# Patient Record
Sex: MALE | Race: WHITE | NOT HISPANIC OR LATINO | Employment: OTHER | URBAN - METROPOLITAN AREA
[De-identification: names, ages, dates, MRNs, and addresses within clinical notes are randomized per-mention and may not be internally consistent; named-entity substitution may affect disease eponyms.]

---

## 2017-02-09 ENCOUNTER — COMMUNICATION - HEALTHEAST (OUTPATIENT)
Dept: FAMILY MEDICINE | Facility: CLINIC | Age: 47
End: 2017-02-09

## 2017-02-09 DIAGNOSIS — F90.0 ATTENTION DEFICIT HYPERACTIVITY DISORDER (ADHD), PREDOMINANTLY INATTENTIVE TYPE: ICD-10-CM

## 2017-03-15 ENCOUNTER — AMBULATORY - HEALTHEAST (OUTPATIENT)
Dept: FAMILY MEDICINE | Facility: CLINIC | Age: 47
End: 2017-03-15

## 2017-03-15 ENCOUNTER — OFFICE VISIT - HEALTHEAST (OUTPATIENT)
Dept: FAMILY MEDICINE | Facility: CLINIC | Age: 47
End: 2017-03-15

## 2017-03-15 DIAGNOSIS — Z13.0 SCREENING FOR ENDOCRINE, NUTRITIONAL, METABOLIC AND IMMUNITY DISORDER: ICD-10-CM

## 2017-03-15 DIAGNOSIS — Z13.29 SCREENING FOR ENDOCRINE, NUTRITIONAL, METABOLIC AND IMMUNITY DISORDER: ICD-10-CM

## 2017-03-15 DIAGNOSIS — Z13.21 SCREENING FOR ENDOCRINE, NUTRITIONAL, METABOLIC AND IMMUNITY DISORDER: ICD-10-CM

## 2017-03-15 DIAGNOSIS — Z79.899 HIGH RISK MEDICATION USE: ICD-10-CM

## 2017-03-15 DIAGNOSIS — R74.8 ELEVATED LIVER ENZYMES: ICD-10-CM

## 2017-03-15 DIAGNOSIS — Z13.228 SCREENING FOR ENDOCRINE, NUTRITIONAL, METABOLIC AND IMMUNITY DISORDER: ICD-10-CM

## 2017-03-15 DIAGNOSIS — F90.0 ATTENTION DEFICIT HYPERACTIVITY DISORDER (ADHD), PREDOMINANTLY INATTENTIVE TYPE: ICD-10-CM

## 2017-03-15 LAB — HBA1C MFR BLD: 5.1 % (ref 3.5–6)

## 2017-03-15 ASSESSMENT — MIFFLIN-ST. JEOR: SCORE: 1804.75

## 2017-03-16 LAB
HBV SURFACE AG SERPL QL IA: NEGATIVE
HCV AB SERPL QL IA: NEGATIVE

## 2017-03-17 ENCOUNTER — COMMUNICATION - HEALTHEAST (OUTPATIENT)
Dept: FAMILY MEDICINE | Facility: CLINIC | Age: 47
End: 2017-03-17

## 2017-04-17 ENCOUNTER — COMMUNICATION - HEALTHEAST (OUTPATIENT)
Dept: FAMILY MEDICINE | Facility: CLINIC | Age: 47
End: 2017-04-17

## 2017-04-17 DIAGNOSIS — F90.0 ATTENTION DEFICIT HYPERACTIVITY DISORDER (ADHD), PREDOMINANTLY INATTENTIVE TYPE: ICD-10-CM

## 2017-04-18 ENCOUNTER — AMBULATORY - HEALTHEAST (OUTPATIENT)
Dept: FAMILY MEDICINE | Facility: CLINIC | Age: 47
End: 2017-04-18

## 2017-04-18 DIAGNOSIS — Z79.899 HIGH RISK MEDICATION USE: ICD-10-CM

## 2017-05-09 ENCOUNTER — OFFICE VISIT - HEALTHEAST (OUTPATIENT)
Dept: FAMILY MEDICINE | Facility: CLINIC | Age: 47
End: 2017-05-09

## 2017-05-09 DIAGNOSIS — F52.8 PSYCHOSEXUAL DYSFUNCTION WITH INHIBITED SEXUAL EXCITEMENT: ICD-10-CM

## 2017-05-09 DIAGNOSIS — D23.0: ICD-10-CM

## 2017-05-09 DIAGNOSIS — F90.0 ATTENTION DEFICIT HYPERACTIVITY DISORDER (ADHD), PREDOMINANTLY INATTENTIVE TYPE: ICD-10-CM

## 2017-05-09 DIAGNOSIS — Z79.899 HIGH RISK MEDICATION USE: ICD-10-CM

## 2017-05-09 ASSESSMENT — MIFFLIN-ST. JEOR: SCORE: 1775.61

## 2017-05-18 ENCOUNTER — COMMUNICATION - HEALTHEAST (OUTPATIENT)
Dept: SCHEDULING | Facility: CLINIC | Age: 47
End: 2017-05-18

## 2017-05-18 DIAGNOSIS — F90.0 ATTENTION DEFICIT HYPERACTIVITY DISORDER (ADHD), PREDOMINANTLY INATTENTIVE TYPE: ICD-10-CM

## 2017-05-27 ENCOUNTER — RECORDS - HEALTHEAST (OUTPATIENT)
Dept: ADMINISTRATIVE | Facility: OTHER | Age: 47
End: 2017-05-27

## 2017-05-27 ENCOUNTER — COMMUNICATION - HEALTHEAST (OUTPATIENT)
Dept: FAMILY MEDICINE | Facility: CLINIC | Age: 47
End: 2017-05-27

## 2017-06-06 ENCOUNTER — RECORDS - HEALTHEAST (OUTPATIENT)
Dept: ADMINISTRATIVE | Facility: OTHER | Age: 47
End: 2017-06-06

## 2017-06-08 ENCOUNTER — COMMUNICATION - HEALTHEAST (OUTPATIENT)
Dept: FAMILY MEDICINE | Facility: CLINIC | Age: 47
End: 2017-06-08

## 2017-06-22 ENCOUNTER — COMMUNICATION - HEALTHEAST (OUTPATIENT)
Dept: SCHEDULING | Facility: CLINIC | Age: 47
End: 2017-06-22

## 2017-06-22 DIAGNOSIS — F90.0 ATTENTION DEFICIT HYPERACTIVITY DISORDER (ADHD), PREDOMINANTLY INATTENTIVE TYPE: ICD-10-CM

## 2017-07-21 ENCOUNTER — COMMUNICATION - HEALTHEAST (OUTPATIENT)
Dept: FAMILY MEDICINE | Facility: CLINIC | Age: 47
End: 2017-07-21

## 2017-07-21 ENCOUNTER — COMMUNICATION - HEALTHEAST (OUTPATIENT)
Dept: SCHEDULING | Facility: CLINIC | Age: 47
End: 2017-07-21

## 2017-07-21 DIAGNOSIS — F90.0 ATTENTION DEFICIT HYPERACTIVITY DISORDER (ADHD), PREDOMINANTLY INATTENTIVE TYPE: ICD-10-CM

## 2017-07-21 DIAGNOSIS — F52.8 PSYCHOSEXUAL DYSFUNCTION WITH INHIBITED SEXUAL EXCITEMENT: ICD-10-CM

## 2017-08-30 ENCOUNTER — OFFICE VISIT (OUTPATIENT)
Dept: FAMILY MEDICINE | Facility: CLINIC | Age: 47
End: 2017-08-30
Payer: MEDICAID

## 2017-08-30 VITALS
DIASTOLIC BLOOD PRESSURE: 91 MMHG | HEIGHT: 71 IN | HEART RATE: 84 BPM | BODY MASS INDEX: 26.25 KG/M2 | TEMPERATURE: 97 F | SYSTOLIC BLOOD PRESSURE: 141 MMHG | WEIGHT: 187.5 LBS

## 2017-08-30 DIAGNOSIS — F10.21 ALCOHOL DEPENDENCE IN REMISSION (H): Primary | ICD-10-CM

## 2017-08-30 PROCEDURE — 99213 OFFICE O/P EST LOW 20 MIN: CPT | Performed by: INTERNAL MEDICINE

## 2017-08-30 NOTE — PROGRESS NOTES
"  SUBJECTIVE:   Brandon Peterson is a 46 year old male who presents to clinic today for the following health issues:      Pt has some paperwork to be fill out for shelter. Currently recover from substance abuse.  ADHD ( balanced and manged.    Went through treatment  [facility] a [facility]  ALcohol ( cannabis for a little bit)    1 week ago.  300 ml ( vodka)  Few days withdrawal.        Problem list and histories reviewed & adjusted, as indicated.  Additional history: as documented    There is no problem list on file for this patient.    No past surgical history on file.    Social History   Substance Use Topics     Smoking status: Current Some Day Smoker     Smokeless tobacco: Current User     Alcohol use No     No family history on file.      Current Outpatient Prescriptions   Medication Sig Dispense Refill     Amphetamine-Dextroamphetamine (ADDERALL PO)        No Known Allergies  No lab results found.         Reviewed and updated as needed this visit by clinical staffTobacco  Allergies  Soc Hx      Reviewed and updated as needed this visit by Provider         ROS:  Constitutional, HEENT, cardiovascular, pulmonary, gi and gu systems are negative, except as otherwise noted.      OBJECTIVE:   BP (!) 141/91 (BP Location: Right arm)  Pulse 84  Temp 97  F (36.1  C) (Oral)  Ht 5' 11\" (1.803 m)  Wt 187 lb 8 oz (85 kg)  BMI 26.15 kg/m2  Body mass index is 26.15 kg/(m^2).  GENERAL: healthy, alert and no distress  NECK: no adenopathy, no asymmetry, masses, or scars and thyroid normal to palpation  RESP: lungs clear to auscultation - no rales, rhonchi or wheezes  CV: regular rate and rhythm, normal S1 S2, no S3 or S4, no murmur, click or rub, no peripheral edema and peripheral pulses strong  ABDOMEN: soft, nontender, no hepatosplenomegaly, no masses and bowel sounds normal  MS: no gross musculoskeletal defects noted, no edema    Diagnostic Test Results:  No results found for this or any previous " visit.    ASSESSMENT/PLAN:       ICD-10-CM    1. Alcohol dependence in remission (H) F10.21      Patient is over the risk period.  Does not want CD  Is enrolled in counaseling, and AA    Paperwork completed.  10 minutes        Shiva Esposito MD  [facility]

## 2017-08-30 NOTE — NURSING NOTE
"Chief Complaint   Patient presents with     paper work,       Initial BP (!) 141/91 (BP Location: Right arm)  Pulse 84  Temp 97  F (36.1  C) (Oral)  Ht 5' 11\" (1.803 m)  Wt 187 lb 8 oz (85 kg)  BMI 26.15 kg/m2 Estimated body mass index is 26.15 kg/(m^2) as calculated from the following:    Height as of this encounter: 5' 11\" (1.803 m).    Weight as of this encounter: 187 lb 8 oz (85 kg).  Medication Reconciliation: complete     Javon Moe MA      "

## 2017-08-30 NOTE — MR AVS SNAPSHOT
"              After Visit Summary   2017    Brandon Peterson    MRN: [id number]           Patient Information     Date Of Birth          1970        Visit Information        Provider Department      2017 12:40 PM Shiva Esposito MD [facility]         Follow-ups after your visit        Who to contact     If you have questions or need follow up information about today's clinic visit or your schedule please contact [facility] directly at [phone].  Normal or non-critical lab and imaging results will be communicated to you by [employer], letter or phone within 4 business days after the clinic has received the results. If you do not hear from us within 7 days, please contact the clinic through [employer] or phone. If you have a critical or abnormal lab result, we will notify you by phone as soon as possible.  Submit refill requests through [employer] or call your pharmacy and they will forward the refill request to us. Please allow 3 business days for your refill to be completed.          Additional Information About Your Visit        [employer] Information     [employer] lets you send messages to your doctor, view your test results, renew your prescriptions, schedule appointments and more. To sign up, go to [email or web address] . Click on \"Log in\" on the left side of the screen, which will take you to the Welcome page. Then click on \"Sign up Now\" on the right side of the page.     You will be asked to enter the access code listed below, as well as some personal information. Please follow the directions to create your username and password.     Your access code is: [email or web address]  Expires: 2017  1:14 PM     Your access code will  in 90 days. If you need help or a new code, please call your [facility] or [phone].        Care EveryWhere ID     This is your Care EveryWhere ID. This could be used by other organizations to access your [facility] medical " "records  [id number]        Your Vitals Were     Pulse Temperature Height BMI (Body Mass Index)          84 97  F (36.1  C) (Oral) 5' 11\" (1.803 m) 26.15 kg/m2         Blood Pressure from Last 3 Encounters:   08/30/17 (!) 141/91   07/15/14 (!) 144/106    Weight from Last 3 Encounters:   08/30/17 187 lb 8 oz (85 kg)              Today, you had the following     No orders found for display       Primary Care Provider Office Phone # Fax #    [facility] [phone]       [address] [facility] [address]        Equal Access to Services     JOEY KRAMER : Hadii maya armaso Soprince, waaxda luqadaha, qaybta kaalmada adeelenayada, harris trujillo . So [facility] [phone].    ATENCIÓN: Si habla español, tiene a arias disposición servicios gratuitos de asistencia lingüística. Llame al [phone].    We comply with applicable federal civil rights laws and [place] laws. We do not discriminate on the basis of race, color, national origin, age, disability sex, sexual orientation or gender identity.            Thank you!     Thank you for choosing [facility]  for your care. Our goal is always to provide you with excellent care. Hearing back from our patients is one way we can continue to improve our services. Please take a few minutes to complete the written survey that you may receive in the mail after your visit with us. Thank you!             Your Updated Medication List - Protect others around you: Learn how to safely use, store and throw away your medicines at www.disposemymeds.org.          This list is accurate as of: 8/30/17  1:14 PM.  Always use your most recent med list.                   Brand Name Dispense Instructions for use Diagnosis    ADDERALL PO             "

## 2017-09-18 ENCOUNTER — COMMUNICATION - HEALTHEAST (OUTPATIENT)
Dept: FAMILY MEDICINE | Facility: CLINIC | Age: 47
End: 2017-09-18

## 2017-09-27 ENCOUNTER — OFFICE VISIT - HEALTHEAST (OUTPATIENT)
Dept: FAMILY MEDICINE | Facility: CLINIC | Age: 47
End: 2017-09-27

## 2017-09-27 DIAGNOSIS — F90.0 ATTENTION DEFICIT HYPERACTIVITY DISORDER (ADHD), PREDOMINANTLY INATTENTIVE TYPE: ICD-10-CM

## 2017-09-27 DIAGNOSIS — Z79.899 CONTROLLED SUBSTANCE AGREEMENT SIGNED: ICD-10-CM

## 2017-09-27 DIAGNOSIS — F52.8 PSYCHOSEXUAL DYSFUNCTION WITH INHIBITED SEXUAL EXCITEMENT: ICD-10-CM

## 2017-09-27 DIAGNOSIS — R74.8 ELEVATED LIVER ENZYMES: ICD-10-CM

## 2017-09-27 DIAGNOSIS — M23.92 KNEE LIGAMENTOUS LAXITY, LEFT: ICD-10-CM

## 2017-09-27 ASSESSMENT — MIFFLIN-ST. JEOR: SCORE: 1758.6

## 2017-10-23 ENCOUNTER — RECORDS - HEALTHEAST (OUTPATIENT)
Dept: ADMINISTRATIVE | Facility: OTHER | Age: 47
End: 2017-10-23

## 2017-10-31 ENCOUNTER — RECORDS - HEALTHEAST (OUTPATIENT)
Dept: ADMINISTRATIVE | Facility: OTHER | Age: 47
End: 2017-10-31

## 2018-01-11 ENCOUNTER — OFFICE VISIT - HEALTHEAST (OUTPATIENT)
Dept: FAMILY MEDICINE | Facility: CLINIC | Age: 48
End: 2018-01-11

## 2018-01-11 DIAGNOSIS — F90.0 ATTENTION DEFICIT HYPERACTIVITY DISORDER (ADHD), PREDOMINANTLY INATTENTIVE TYPE: ICD-10-CM

## 2018-01-11 DIAGNOSIS — R74.8 ELEVATED LIVER ENZYMES: ICD-10-CM

## 2018-01-11 DIAGNOSIS — Z01.818 PREOPERATIVE EVALUATION TO RULE OUT SURGICAL CONTRAINDICATION: ICD-10-CM

## 2018-01-11 DIAGNOSIS — S83.8X2S: ICD-10-CM

## 2018-01-11 LAB
ALBUMIN SERPL-MCNC: 4.1 G/DL (ref 3.5–5)
ALP SERPL-CCNC: 72 U/L (ref 45–120)
ALT SERPL W P-5'-P-CCNC: 105 U/L (ref 0–45)
AST SERPL W P-5'-P-CCNC: 116 U/L (ref 0–40)
BILIRUB DIRECT SERPL-MCNC: 0.5 MG/DL
BILIRUB SERPL-MCNC: 1.4 MG/DL (ref 0–1)
ERYTHROCYTE [DISTWIDTH] IN BLOOD BY AUTOMATED COUNT: 14.3 % (ref 11–14.5)
GGT SERPL-CCNC: 100 U/L (ref 0–50)
HCT VFR BLD AUTO: 46.8 % (ref 40–54)
HGB BLD-MCNC: 15 G/DL (ref 14–18)
INR PPP: 1.04 (ref 0.9–1.1)
MCH RBC QN AUTO: 29.2 PG (ref 27–34)
MCHC RBC AUTO-ENTMCNC: 32.1 G/DL (ref 32–36)
MCV RBC AUTO: 91 FL (ref 80–100)
PLATELET # BLD AUTO: 187 THOU/UL (ref 140–440)
PMV BLD AUTO: 7.4 FL (ref 7–10)
PROT SERPL-MCNC: 7.7 G/DL (ref 6–8)
RBC # BLD AUTO: 5.14 MILL/UL (ref 4.4–6.2)
WBC: 6.5 THOU/UL (ref 4–11)

## 2018-01-11 ASSESSMENT — MIFFLIN-ST. JEOR: SCORE: 1790.35

## 2018-01-12 ENCOUNTER — RECORDS - HEALTHEAST (OUTPATIENT)
Dept: ADMINISTRATIVE | Facility: OTHER | Age: 48
End: 2018-01-12

## 2018-01-13 ENCOUNTER — COMMUNICATION - HEALTHEAST (OUTPATIENT)
Dept: FAMILY MEDICINE | Facility: CLINIC | Age: 48
End: 2018-01-13

## 2018-02-23 ENCOUNTER — COMMUNICATION - HEALTHEAST (OUTPATIENT)
Dept: FAMILY MEDICINE | Facility: CLINIC | Age: 48
End: 2018-02-23

## 2018-02-23 DIAGNOSIS — F90.0 ATTENTION DEFICIT HYPERACTIVITY DISORDER (ADHD), PREDOMINANTLY INATTENTIVE TYPE: ICD-10-CM

## 2018-04-09 ENCOUNTER — COMMUNICATION - HEALTHEAST (OUTPATIENT)
Dept: FAMILY MEDICINE | Facility: CLINIC | Age: 48
End: 2018-04-09

## 2018-04-09 DIAGNOSIS — F90.0 ATTENTION DEFICIT HYPERACTIVITY DISORDER (ADHD), PREDOMINANTLY INATTENTIVE TYPE: ICD-10-CM

## 2018-04-09 DIAGNOSIS — F52.8 PSYCHOSEXUAL DYSFUNCTION WITH INHIBITED SEXUAL EXCITEMENT: ICD-10-CM

## 2018-04-11 ENCOUNTER — COMMUNICATION - HEALTHEAST (OUTPATIENT)
Dept: FAMILY MEDICINE | Facility: CLINIC | Age: 48
End: 2018-04-11

## 2018-04-11 DIAGNOSIS — F90.0 ATTENTION DEFICIT HYPERACTIVITY DISORDER (ADHD), PREDOMINANTLY INATTENTIVE TYPE: ICD-10-CM

## 2018-04-11 DIAGNOSIS — F52.8 PSYCHOSEXUAL DYSFUNCTION WITH INHIBITED SEXUAL EXCITEMENT: ICD-10-CM

## 2018-05-16 ENCOUNTER — COMMUNICATION - HEALTHEAST (OUTPATIENT)
Dept: FAMILY MEDICINE | Facility: CLINIC | Age: 48
End: 2018-05-16

## 2018-05-16 DIAGNOSIS — F52.8 PSYCHOSEXUAL DYSFUNCTION WITH INHIBITED SEXUAL EXCITEMENT: ICD-10-CM

## 2018-05-16 DIAGNOSIS — F90.0 ATTENTION DEFICIT HYPERACTIVITY DISORDER (ADHD), PREDOMINANTLY INATTENTIVE TYPE: ICD-10-CM

## 2018-06-21 ENCOUNTER — COMMUNICATION - HEALTHEAST (OUTPATIENT)
Dept: FAMILY MEDICINE | Facility: CLINIC | Age: 48
End: 2018-06-21

## 2018-06-21 DIAGNOSIS — F90.0 ATTENTION DEFICIT HYPERACTIVITY DISORDER (ADHD), PREDOMINANTLY INATTENTIVE TYPE: ICD-10-CM

## 2018-06-26 ENCOUNTER — COMMUNICATION - HEALTHEAST (OUTPATIENT)
Dept: FAMILY MEDICINE | Facility: CLINIC | Age: 48
End: 2018-06-26

## 2018-06-26 DIAGNOSIS — F90.0 ATTENTION DEFICIT HYPERACTIVITY DISORDER (ADHD), PREDOMINANTLY INATTENTIVE TYPE: ICD-10-CM

## 2018-08-09 ENCOUNTER — COMMUNICATION - HEALTHEAST (OUTPATIENT)
Dept: FAMILY MEDICINE | Facility: CLINIC | Age: 48
End: 2018-08-09

## 2018-08-09 DIAGNOSIS — F90.0 ATTENTION DEFICIT HYPERACTIVITY DISORDER (ADHD), PREDOMINANTLY INATTENTIVE TYPE: ICD-10-CM

## 2021-05-30 VITALS — HEIGHT: 71 IN | WEIGHT: 196.75 LBS | BODY MASS INDEX: 27.54 KG/M2

## 2021-05-30 VITALS — HEIGHT: 71 IN | BODY MASS INDEX: 28.42 KG/M2 | WEIGHT: 203 LBS

## 2021-05-31 VITALS — HEIGHT: 71 IN | WEIGHT: 193 LBS | BODY MASS INDEX: 27.02 KG/M2

## 2021-05-31 VITALS — HEIGHT: 71 IN | BODY MASS INDEX: 28 KG/M2 | WEIGHT: 200 LBS

## 2021-06-09 NOTE — PROGRESS NOTES
ASSESSMENT & PLAN:  1. Screening for endocrine, nutritional, metabolic and immunity disorder  - Glycosylated Hemoglobin A1c  2. Elevated liver enzymes  From alcohol abuse, we discussed about the different options treatment, support groups like AA meeting, chemical dependency reference,  - Hepatitis B Surface Antigen (HBsAG)  - Comprehensive Metabolic Panel  - Bilirubin, Direct  - GGT (Gamma GT)  - Hepatitis C Antibody (Anti-HCV)  3. High risk medication use  Currently on Adderall for ADHD, but I told him that if he continued to drink alcohol while taking this medication, then should not be a candidate for Adderral.  We will recheck his LFTs every month  - Pain Clinic Survey, Urine      Patient Instructions   Brandon,  Liver function tests are still significantly elevated from alcohol use, I like to see these numbers  going down, as we discussed, I would like you to see  chemical dependency specialist, start  support like AA meeting, if your numbers are not improving,and you continue to drink alcohol, I will be reluctant to continue prescribed Adderall to you.recheck in 1 month.      No orders of the defined types were placed in this encounter.    Medications Discontinued During This Encounter   Medication Reason     dextroamphetamine-amphetamine 30 mg Tab Therapy completed       No Follow-up on file.    CHIEF COMPLAINT:  Chief Complaint   Patient presents with     Medication Refill     ADDERALL     Labs Only       HISTORY OF PRESENT ILLNESS:  Brandon is a 46 y.o. male presenting to the clinic today for a refill of Adderall. He notes that the medication is working well for him, and he does not have any side effects. 30mg twice a day. He notes that the medication helps calm him; he is able to sleep and focus better. He is able to concentrate at one thing at a time, instead of becoming hyperactive.     Alcohol Abuse: His liver enzymes were high today. He often drinks alcohol and has had abuse problems in the past. He has  "been in rehab in the past. He does not like to drink on his medication and is aware of the side effects. He is looking for a new  support group due to a recent move.       REVIEW OF SYSTEMS:   He is feeling stressed; he is about to get  and inherit 2 kids. He is feeling the most happy and healthy that he has been in his life. He admits to smoking marijuana a couple of weeks ago. All other systems are negative.    PFSH:  Medical: a  Surgical: a  Family: a  Social: a    Tobacco Use:  History   Smoking Status     Former Smoker   Smokeless Tobacco     Current User       VITALS:  Vitals:    03/15/17 1155 03/15/17 1158   BP: (!) 136/100 122/86   Patient Site: Right Arm Left Arm   Pulse: 68    Resp: 13    Temp: 98.6  F (37  C)    TempSrc: Oral    Weight: 203 lb (92.1 kg)    Height: 5' 10.8\" (1.798 m)      Wt Readings from Last 3 Encounters:   03/15/17 203 lb (92.1 kg)   11/30/16 204 lb (92.5 kg)   05/09/16 198 lb (89.8 kg)     Body mass index is 28.47 kg/(m^2).    PHYSICAL EXAM:  General:  Patient alert, in no acute distress.  Resp:  Clear to auscultation without crackles, wheezes or distress.  Normal respiratory effort.   CV:  Regular rate and rhythm without murmurs, rubs or gallops.  Normal pedal pulses.  No varicosities or edema.  Abdomen:  Soft, non-tender, without hepatosplenomegaly, masses, or hernias.   Neuro:  CN II-XII intact, motor & sensory function all intact.  DTR and reflexes normal.  Psychiatric:  Alert & oriented with normal mood and affect.  Good judgment and insight.  Thought process and language adequate. Well-groomed. Psychomotor activity normal.      ADDITIONAL HISTORY SUMMARIZED (2): None.  DECISION TO OBTAIN EXTRA INFORMATION (1): None.   RADIOLOGY TESTS (1): None.  LABS (1): None.  MEDICINE TESTS (1): None.  INDEPENDENT REVIEW (2 each): None.     The visit lasted a total of 14 minutes face to face with the patient. Over 50% of the time was spent counseling and educating the patient about " medication management.    I, Edda Le, am scribing for and in the presence of, Dr. Barcenas.    I, Dr. Barcenas, personally performed the services described in this documentation, as scribed by Edda Le in my presence, and it is both accurate and complete.    Dragon dictation was used for this note.  Speech recognition errors are a possibility.    MEDICATIONS:  Current Outpatient Prescriptions   Medication Sig Dispense Refill     dextroamphetamine-amphetamine (ADDERALL) 30 mg Tab Take 30 mg by mouth daily. 60 tablet 0     IBUPROFEN ORAL        multivitamin (MULTIVITAMIN) per tablet Take 1 tablet by mouth daily.       sildenafil (REVATIO) 20 mg tablet Take 1 tablet (20 mg total) by mouth as needed. 30 tablet 0     fluticasone (FLONASE) 50 mcg/actuation nasal spray 2 sprays into each nostril daily. 16 g 11     No current facility-administered medications for this visit.        Total data points: 0

## 2021-06-13 NOTE — PROGRESS NOTES
"OFFICE VISIT - FAMILY MEDICINE     ASSESSMENT AND PLAN     1. Attention deficit hyperactivity disorder (ADHD), predominantly inattentive type     2. Male Erectile Disorder  sildenafil (REVATIO) 20 mg tablet   3. Controlled substance agreement signed     4. Knee ligamentous laxity, left  Ambulatory referral to Orthopedics   5. Elevated liver enzymes  HM2(CBC w/o Differential)    INR    Hepatic Profile    GGT (Gamma GT)   ADHD appears stable, continue current treatment, check every 6 months, sooner if there is any question concern.  Long-term side effect of medication discussed.  Erectile dysfunction controlled with sildenafil, knee laxity, suspecting ACL tear, he will see orthopedics.    CHIEF COMPLAINT   Medication Refill (REVATIO); Back Pain (X 1 MTH); Blood Pressure Check; and Knee Pain (REF?)    HPI   Brandon Peterson is a 46 y.o. male.  Updated NEHAL    Recently moved back to [place] his fiancée, does have a history of ADHD diagnosed about 7-8 years ago, has been on Adderall since then.  Medication does help him focus, complete his task at work.  Denies any significant side effects like headaches, dizziness, loss of appetite or chest pain.  Has had some issues with alcohol and marijuana use in the past, but has been sober for almost 9 months.  Has been on long acting Adderall in the past, but did not seems to help and was experiencing more side effect.  Left knee seems to be bothering him lately,feel loose no significant pain.  Does have an appointment to see orthopedist.  Sildenafil 20 mg is helping his erectile dysfunction, he would like a refill.  Review of Systems As per HPI, otherwise negative.    OBJECTIVE   /84 (Patient Site: Right Arm)  Pulse 64  Temp 98.4  F (36.9  C) (Oral)   Resp 13  Ht 5' 10.75\" (1.797 m)  Wt 193 lb (87.5 kg)  BMI 27.11 kg/m2  Physical Exam   Constitutional: He is oriented to person, place, and time. He appears well-developed and well-nourished.   HENT:   Head: " Normocephalic and atraumatic.   Neck: Normal range of motion. Neck supple. No JVD present. No tracheal deviation present. No thyromegaly present.   Cardiovascular: Normal rate, regular rhythm, normal heart sounds and intact distal pulses.  Exam reveals no gallop and no friction rub.    No murmur heard.  Pulmonary/Chest: Effort normal and breath sounds normal. No respiratory distress. He has no wheezes. He has no rales.   Musculoskeletal: He exhibits no edema or tenderness.   Laxity of the left knee, with positive anterior drawer.   Lymphadenopathy:     He has no cervical adenopathy.   Neurological: He is alert and oriented to person, place, and time. Coordination normal.   Psychiatric: He has a normal mood and affect. Judgment and thought content normal.       [facility]     Family History   Problem Relation Age of Onset     Diabetes Father      Cancer Maternal Grandfather      Bone cancer     Social History     Social History     Marital status:      Spouse name: N/A     Number of children: N/A     Years of education: N/A     Occupational History     Not on file.     Social History Main Topics     Smoking status: Former Smoker     Smokeless tobacco: Current User     Alcohol use No      Comment: Quit drinking a few years ago.     Drug use: Not on file     Sexual activity: Not Currently     Partners: Female     Other Topics Concern     Not on file     Social History Narrative     Relevant history was reviewed with the patient today, unless noted in HPI nothing is pertinent for this visit.  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     Elevated liver enzymes 03/25/2015     Hepatitis B vaccination not up to date 03/25/2015     Failure to attend appointment 02/05/2015     Overview Note:     2/2/2015       Allergic Rhinitis      Overview Note:     Replacement Utility updated for latest  IMO load       Lower Back Pain      Insomnia      Anxiety Disorder NOS      Overview Note:     Replacement Utility updated for latest IMO load       Male Erectile Disorder      Attention deficit hyperactivity disorder (ADHD)      Male Infertility      Past Surgical History:   Procedure Laterality Date     KNEE ARTHROSCOPY       WISDOM TOOTH EXTRACTION         RESULTS/CONSULTS (Lab/Rad)     Recent Results (from the past 168 hour(s))   HM2(CBC w/o Differential)   Result Value Ref Range    WBC 6.4 4.0 - 11.0 thou/uL    RBC 5.03 4.40 - 6.20 mill/uL    Hemoglobin 14.8 14.0 - 18.0 g/dL    Hematocrit 45.2 40.0 - 54.0 %    MCV 90 80 - 100 fL    MCH 29.3 27.0 - 34.0 pg    MCHC 32.6 32.0 - 36.0 g/dL    RDW 13.0 11.0 - 14.5 %    Platelets 205 140 - 440 thou/uL    MPV 7.6 7.0 - 10.0 fL   INR   Result Value Ref Range    INR 1.03 0.90 - 1.10   Hepatic Profile   Result Value Ref Range    Bilirubin, Total 0.5 0.0 - 1.0 mg/dL    Bilirubin, Direct 0.2 <=0.5 mg/dL    Protein, Total 7.5 6.0 - 8.0 g/dL    Albumin 4.0 3.5 - 5.0 g/dL    Alkaline Phosphatase 76 45 - 120 U/L    AST 27 0 - 40 U/L    ALT 22 0 - 45 U/L   GGT (Gamma GT)   Result Value Ref Range    GGT (Gamma GT) 26 0 - 50 U/L     No results found.  MEDICATIONS     Current Outpatient Prescriptions on File Prior to Visit   Medication Sig Dispense Refill     dextroamphetamine-amphetamine (ADDERALL) 30 mg Tab Take 30 mg by mouth 2 (two) times a day. 60 tablet 0     fluticasone (FLONASE) 50 mcg/actuation nasal spray 2 sprays into each nostril daily. 16 g 11     multivitamin (MULTIVITAMIN) per tablet Take 1 tablet by mouth daily.       dextroamphetamine-amphetamine 30 mg Tab Take 30 mg by mouth 2 (two) times a day. 60 tablet 0     IBUPROFEN ORAL        No current facility-administered medications on file prior to visit.        HEALTH MAINTENANCE / SCREENING   PHQ-2 Total Score: 1 (3/15/2017 11:00 AM), No Data Recorded,No Data Recorded  Immunization History   Administered Date(s)  Administered     Hep A, Adult 05/09/2016     Hep A, historic 01/17/2014     Influenza, seasonal,quad inj 6-35 mos 11/07/2012, 10/13/2014     Influenza,seasonal quad, PF 10/17/2013     Influenza,seasonal quad, PF, 36+MOS 11/17/2015     Tdap 07/28/2009     Health Maintenance   Topic     INFLUENZA VACCINE RULE BASED (1)     TDAP ADULT ONE TIME DOSE      ADVANCE DIRECTIVES DISCUSSED WITH PATIENT      TD 18+ HE        Keeley Barcenas MD  Family Medicine, [facility]   This transcription uses voice recognition software, which may contain typographical errors.

## 2021-06-15 NOTE — PROGRESS NOTES
Preoperative Exam    Scheduled Procedure: L KNEE SURGERY   Surgery Date:  01/12  Surgery Location: [facility]  Surgeon:  DR HARTMANN    Assessment/Plan:     1. Preoperative evaluation to rule out surgical contraindication    2. Acute meniscal injury of left knee, sequela    3. Elevated liver enzymes  - HM2(CBC w/o Differential)  - INR  - Hepatic Profile  - GGT (Gamma GT)     Surgical Procedure Risk: Low (reported cardiac risk generally < 1%)  Have you had prior anesthesia?: Yes  Have you or any family members had a previous anesthesia reaction:  No  Do you or any family members have a history of a clotting or bleeding disorder?: Yes: Grand Mother legs problems,?varicoce vein  Cardiac Risk Assessment: no increased risk for major cardiac complications  Reviewed risks (not limited to bleeding,infection,pain,un-anticipated response to anesthesia ecc) and benefits (diagnostic and therapeutic) of surgery with patient, he understands the risks of the procedure and would like to proceed.  Patient's active problems diagnostically and therapeutically optimized for planned procedure with, Local or General anesthesia    Patient approved for surgery with general or local anesthesia.    Please Note:  patient is taking adderal for ADHD    Functional Status: Independant  Patient plans to recover at home with family.     Subjective:      Brandon Peterson is a 47 y.o. male who presents for a preoperative consultation.    Left knee pain with meniscus injury, conservative management did not help, scheduled for knee arthroscopic surgery.  Tolerated a previous right knee arthroscopic surgery.  Will be moving to [place] in about a month.  All other systems reviewed and are negative, other than those listed in the HPI.    Pertinent History  Do you have difficulty breathing or chest pain after walking up a flight of stairs: No  History of obstructive sleep apnea: No  Steroid use in the last 6 months: No  Frequent Aspirin/NSAID use: Yes:  but stopped  Prior Blood Transfusion: No  Prior Blood Transfusion Reaction: No  If for some reason prior to, during or after the procedure, if it is medically indicated, would you be willing to have a blood transfusion?:  There is no transfusion refusal.    Current Outpatient Prescriptions   Medication Sig Dispense Refill     dextroamphetamine-amphetamine (ADDERALL) 30 mg Tab Take 30 mg by mouth 2 (two) times a day. 60 tablet 0     dextroamphetamine-amphetamine (ADDERALL) 30 mg Tab Take 30 mg by mouth 2 (two) times a day. 60 tablet 0     dextroamphetamine-amphetamine 30 mg Tab Take 30 mg by mouth 2 (two) times a day. 60 tablet 0     fluticasone (FLONASE) 50 mcg/actuation nasal spray 2 sprays into each nostril daily. 16 g 11     IBUPROFEN ORAL        multivitamin (MULTIVITAMIN) per tablet Take 1 tablet by mouth daily.       sildenafil (REVATIO) 20 mg tablet Take 2 tablets (40 mg total) by mouth as needed (1 h before needed). 30 tablet 3     No current facility-administered medications for this visit.         Allergies   Allergen Reactions     Citalopram Analogues Rash       Patient Active Problem List   Diagnosis     Allergic Rhinitis     Lower Back Pain     Insomnia     Anxiety Disorder NOS     Male Erectile Disorder     Attention deficit hyperactivity disorder (ADHD)     Male Infertility     Failure to attend appointment     Elevated liver enzymes     Hepatitis B vaccination not up to date     Mucocele of appendix     Scrotal mass     Controlled substance agreement signed       No past medical history on file.    Past Surgical History:   Procedure Laterality Date     KNEE ARTHROSCOPY       WISDOM TOOTH EXTRACTION         Social History     Social History     Marital status:      Spouse name: N/A     Number of children: N/A     Years of education: N/A     Occupational History     Not on file.     Social History Main Topics     Smoking status: Former Smoker     Smokeless tobacco: Current User     Alcohol use  No      Comment: Quit drinking a few years ago.     Drug use: Not on file     Sexual activity: Not Currently     Partners: Female     Other Topics Concern     Not on file     Social History Narrative           Review of Systems   Constitutional: Negative.    HENT: Negative.    Eyes: Negative.    Respiratory: Negative.    Cardiovascular: Negative.    Gastrointestinal: Negative.    Endocrine: Negative.    Genitourinary: Negative.    Musculoskeletal: Positive for joint swelling (left knee).   Skin: Negative.    Neurological: Negative.    Hematological: Negative.    Psychiatric/Behavioral: Negative.        Objective:     There were no vitals filed for this visit.      Physical Exam:  Physical Exam   Constitutional: He appears well-developed and well-nourished.   HENT:   Right Ear: External ear normal.   Left Ear: External ear normal.   Nose: Nose normal.   Mouth/Throat: Oropharynx is clear and moist.   Eyes: Conjunctivae and EOM are normal. Pupils are equal, round, and reactive to light. Right eye exhibits no discharge. Left eye exhibits no discharge.   Neck: No thyromegaly present.   Cardiovascular: Normal rate, regular rhythm and normal heart sounds.    No murmur heard.  Pulmonary/Chest: Effort normal and breath sounds normal.   Abdominal: Soft. Bowel sounds are normal. He exhibits no distension and no mass. There is no tenderness. There is no rebound and no guarding.   Genitourinary: Right testis is descended. Left testis is descended.   Musculoskeletal: Normal range of motion. He exhibits tenderness (left knee).   Lymphadenopathy:     He has no cervical adenopathy.   Neurological: He is alert. He has normal reflexes.   Skin: Skin is warm and dry. No rash noted.   Psychiatric: He has a normal mood and affect.       There are no Patient Instructions on file for this visit.      Labs:  Recent Results (from the past 24 hour(s))   HM2(CBC w/o Differential)    Collection Time: 01/11/18  2:16 PM   Result Value Ref Range     WBC 6.5 4.0 - 11.0 thou/uL    RBC 5.14 4.40 - 6.20 mill/uL    Hemoglobin 15.0 14.0 - 18.0 g/dL    Hematocrit 46.8 40.0 - 54.0 %    MCV 91 80 - 100 fL    MCH 29.2 27.0 - 34.0 pg    MCHC 32.1 32.0 - 36.0 g/dL    RDW 14.3 11.0 - 14.5 %    Platelets 187 140 - 440 thou/uL    MPV 7.4 7.0 - 10.0 fL       Immunization History   Administered Date(s) Administered     Hep A, Adult IM (19yr & older) 01/17/2014, 05/09/2016     Hep A, historic 01/17/2014     Influenza, seasonal,quad inj 6-35 mos 11/07/2012, 10/13/2014     Influenza,seasonal quad, PF 10/17/2013     Influenza,seasonal quad, PF, 36+MOS 10/17/2013, 11/17/2015     Influenza,seasonal, Inj IIV3 11/07/2012, 10/13/2014     Tdap 07/28/2009           Electronically signed by Keeley Barcenas MD 01/11/18 2:32 PM

## 2021-06-16 PROBLEM — Z79.899 CONTROLLED SUBSTANCE AGREEMENT SIGNED: Status: ACTIVE | Noted: 2017-09-27

## 2021-07-03 NOTE — ADDENDUM NOTE
Addendum Note by Keeley Barcenas MD at 6/22/2017  5:19 PM     Author: Keeley Barcenas MD Service: -- Author Type: Physician    Filed: 6/22/2017  5:19 PM Encounter Date: 6/22/2017 Status: Signed    : Keeley Barcenas MD (Physician)    Addended by: KEELEY BARCENAS on: 6/22/2017 05:19 PM        Modules accepted: Orders

## 2021-08-07 ENCOUNTER — HEALTH MAINTENANCE LETTER (OUTPATIENT)
Age: 51
End: 2021-08-07

## 2021-10-02 ENCOUNTER — HEALTH MAINTENANCE LETTER (OUTPATIENT)
Age: 51
End: 2021-10-02

## 2021-10-13 NOTE — PROGRESS NOTES
[facility]  FAMILY  MEDICINE  OFFICE  VISIT     ASSESSMENT & PLAN     Diagnoses and all orders for this visit:    Angiokeratoma of lip  -     Ambulatory referral to Dentistry    Attention deficit hyperactivity disorder (ADHD), predominantly inattentive type  -     dextroamphetamine-amphetamine 30 mg Tab; Take 30 mg by mouth 2 (two) times a day.  Dispense: 60 tablet; Refill: 0  Adderall was refilled, urine drug test will be done, if negative then would consider 3 months prescription with regular follow-up.  If positive then we will not prescribe Adderall to him anymore, we will have patient seen different provider.  Male Erectile Disorder  -     sildenafil (REVATIO) 20 mg tablet; Take 1 tablet (20 mg total) by mouth as needed.  Dispense: 30 tablet; Refill: 1  LFTs elevation  -     HM2(CBC w/o Differential)  -     INR  -     Hepatic Profile  -     GGT (Gamma GT)  -     Vitamin B12  -     Magnesium  -     Folate, Serum  Has been seen at the outpatient treatment, has been sober for over a months, check LFTs panels.  Encourage sobriety.      SUBJECTIVE   No specialty comments available.  Patient with ADHD, history of alcohol abuse with relapsing remitting episodes, patient had some mildly elevated liver function tests at his last visit, and urine drug screen was positive for marijuana.  Result were discussed with the patient, Today,he stating that has been cutting on the alcohol, has been going to outpatient treatment, has not smoked marijuana for a few months long, planning on getting  in July.  Adderall does help him focus, is able to complete his daily task, otherwise he will be having some issue with concentration delay of finishing his daily tasks.  Usually takes 30 mg 2 times a day with good symptoms control.  Has a small lesion in his left lowe lips, was told that is an angioma, sometimes will bleed,would like to see a specialist  "for further care.  Has been taking Revatio 20 mg for erectile dysfunction with good response.  Asking a refill.    OBJECTIVE    /78 (Patient Site: Left Arm, Patient Position: Sitting, Cuff Size: Adult Regular)  Pulse 72  Ht 5' 10.75\" (1.797 m)  Wt 196 lb 12 oz (89.2 kg)  BMI 27.64 kg/m2  Physical Exam   Constitutional: He appears well-developed and well-nourished.   HENT:   Head: Normocephalic and atraumatic.   Mouth/Throat: Oral lesions (1/2 cm left lower lip angioma lesion,red,bluish,no active bleeding) present.   Neck: Normal range of motion. Neck supple. No JVD present. No tracheal deviation present. No thyromegaly present.   Cardiovascular: Normal rate, regular rhythm, normal heart sounds and intact distal pulses.  Exam reveals no gallop and no friction rub.    No murmur heard.  Pulmonary/Chest: Effort normal and breath sounds normal. No respiratory distress. He has no wheezes. He has no rales.   Abdominal: He exhibits no distension. There is no tenderness. There is no rebound and no guarding.   Musculoskeletal: He exhibits no edema or tenderness.   Lymphadenopathy:     He has no cervical adenopathy.   Psychiatric: He has a normal mood and affect. Judgment and thought content normal.     Keeley Barcenas MD  Family Medicine, [facility]   This transcription uses voice recognition software, which may contain typographical errors. Chief Complaint:    Chief Complaint   Patient presents with     Follow-up     Med check     Brandon Peterson is a 46 y.o. male.  Updated MIIC       There are no preventive care reminders to display for this patient.    Patient Active Problem List   Diagnosis     Allergic Rhinitis     Lower Back Pain     Insomnia     Anxiety Disorder NOS     Male Erectile Disorder     Attention deficit hyperactivity disorder (ADHD)     Male Infertility     Failure to attend appointment     Elevated liver enzymes     Hepatitis A vaccination not up to date     Hepatitis B " "vaccination not up to date     Mucocele of appendix     Scrotal mass     No past medical history on file.    Current Outpatient Prescriptions   Medication Sig Dispense Refill     [START ON 5/17/2017] dextroamphetamine-amphetamine 30 mg Tab Take 30 mg by mouth 2 (two) times a day. 60 tablet 0     fluticasone (FLONASE) 50 mcg/actuation nasal spray 2 sprays into each nostril daily. 16 g 11     IBUPROFEN ORAL        multivitamin (MULTIVITAMIN) per tablet Take 1 tablet by mouth daily.       sildenafil (REVATIO) 20 mg tablet Take 1 tablet (20 mg total) by mouth as needed. 30 tablet 1     No current facility-administered medications for this visit.      Allergies as of 05/09/2017 - Alec as Reviewed 05/09/2017   Allergen Reaction Noted     Citalopram analogues Rash 07/28/2011     ROS    \"Review of systems complete head to toe is otherwise negative.\"}  OTHER     Wt Readings from Last 3 Encounters:   05/09/17 196 lb 12 oz (89.2 kg)   03/15/17 203 lb (92.1 kg)   11/30/16 204 lb (92.5 kg)       PHQ-9:  PHQ-2 Total Score: 1 (3/15/2017 11:00 AM)  The following are part of a depression follow up plan for the patient:  coping support assessment, coping support management and emotional support assessment  No Follow-up on file.    Social History   Substance Use Topics     Smoking status: Former Smoker     Smokeless tobacco: Current User     Alcohol use No      Comment: Quit drinking a few years ago.       Family History   Problem Relation Age of Onset     Diabetes Father      Cancer Maternal Grandfather      Bone cancer       Past Surgical History:   Procedure Laterality Date     KNEE ARTHROSCOPY       WISDOM TOOTH EXTRACTION         Social History     Social History Narrative       RESULTS (Lab/Rad)     Recent Results (from the past 168 hour(s))   HM2(CBC w/o Differential)   Result Value Ref Range    WBC 5.5 4.0 - 11.0 thou/uL    RBC 5.03 4.40 - 6.20 mill/uL    Hemoglobin 15.3 14.0 - 18.0 g/dL    Hematocrit 46.2 40.0 - 54.0 %    MCV " 92 80 - 100 fL    MCH 30.3 27.0 - 34.0 pg    MCHC 33.0 32.0 - 36.0 g/dL    RDW 11.8 11.0 - 14.5 %    Platelets 196 140 - 440 thou/uL    MPV 7.9 7.0 - 10.0 fL   INR   Result Value Ref Range    INR 1.01 0.90 - 1.10   Hepatic Profile   Result Value Ref Range    Bilirubin, Total 0.7 0.0 - 1.0 mg/dL    Bilirubin, Direct 0.2 <=0.5 mg/dL    Protein, Total 7.5 6.0 - 8.0 g/dL    Albumin 4.2 3.5 - 5.0 g/dL    Alkaline Phosphatase 62 45 - 120 U/L    AST 42 (H) 0 - 40 U/L    ALT 59 (H) 0 - 45 U/L   GGT (Gamma GT)   Result Value Ref Range    GGT (Gamma GT) 136 (H) 0 - 50 U/L   Vitamin B12   Result Value Ref Range    Vitamin B-12 709 213 - 816 pg/mL   Magnesium   Result Value Ref Range    Magnesium 2.1 1.8 - 2.6 mg/dL   Folate, Serum   Result Value Ref Range    Folate 10.7 >=3.5 ng/mL     No results found.            Mohs Method Verbiage: An incision at a 45 degree angle following the standard Mohs approach was done and the specimen was harvested as a microscopic controlled layer.

## 2022-09-03 ENCOUNTER — HEALTH MAINTENANCE LETTER (OUTPATIENT)
Age: 52
End: 2022-09-03

## 2023-09-30 ENCOUNTER — HEALTH MAINTENANCE LETTER (OUTPATIENT)
Age: 53
End: 2023-09-30